# Patient Record
Sex: MALE | Race: OTHER | HISPANIC OR LATINO
[De-identification: names, ages, dates, MRNs, and addresses within clinical notes are randomized per-mention and may not be internally consistent; named-entity substitution may affect disease eponyms.]

---

## 2019-06-24 PROBLEM — Z00.00 ENCOUNTER FOR PREVENTIVE HEALTH EXAMINATION: Status: ACTIVE | Noted: 2019-06-24

## 2019-06-27 ENCOUNTER — APPOINTMENT (OUTPATIENT)
Dept: SURGERY | Facility: CLINIC | Age: 74
End: 2019-06-27
Payer: MEDICARE

## 2019-06-27 VITALS
HEART RATE: 69 BPM | SYSTOLIC BLOOD PRESSURE: 155 MMHG | TEMPERATURE: 98 F | DIASTOLIC BLOOD PRESSURE: 89 MMHG | HEIGHT: 67 IN | BODY MASS INDEX: 25.58 KG/M2 | WEIGHT: 163 LBS

## 2019-06-27 PROCEDURE — 99203 OFFICE O/P NEW LOW 30 MIN: CPT

## 2020-06-10 ENCOUNTER — EMERGENCY (EMERGENCY)
Facility: HOSPITAL | Age: 75
LOS: 1 days | Discharge: HOME | End: 2020-06-10
Attending: EMERGENCY MEDICINE
Payer: MEDICARE

## 2020-06-10 VITALS
HEART RATE: 56 BPM | OXYGEN SATURATION: 98 % | SYSTOLIC BLOOD PRESSURE: 159 MMHG | RESPIRATION RATE: 18 BRPM | TEMPERATURE: 98 F | DIASTOLIC BLOOD PRESSURE: 88 MMHG

## 2020-06-10 VITALS
WEIGHT: 147.93 LBS | RESPIRATION RATE: 18 BRPM | DIASTOLIC BLOOD PRESSURE: 90 MMHG | OXYGEN SATURATION: 99 % | SYSTOLIC BLOOD PRESSURE: 162 MMHG | HEIGHT: 67 IN | HEART RATE: 54 BPM | TEMPERATURE: 98 F

## 2020-06-10 LAB
ALBUMIN SERPL ELPH-MCNC: 3.5 G/DL — SIGNIFICANT CHANGE UP (ref 3.5–5)
ALP SERPL-CCNC: 61 U/L — SIGNIFICANT CHANGE UP (ref 40–120)
ALT FLD-CCNC: 18 U/L DA — SIGNIFICANT CHANGE UP (ref 10–60)
ANION GAP SERPL CALC-SCNC: 10 MMOL/L — SIGNIFICANT CHANGE UP (ref 5–17)
AST SERPL-CCNC: 15 U/L — SIGNIFICANT CHANGE UP (ref 10–40)
BASOPHILS # BLD AUTO: 0.09 K/UL — SIGNIFICANT CHANGE UP (ref 0–0.2)
BASOPHILS NFR BLD AUTO: 1.3 % — SIGNIFICANT CHANGE UP (ref 0–2)
BILIRUB SERPL-MCNC: 1 MG/DL — SIGNIFICANT CHANGE UP (ref 0.2–1.2)
BUN SERPL-MCNC: 13 MG/DL — SIGNIFICANT CHANGE UP (ref 7–18)
CALCIUM SERPL-MCNC: 9.1 MG/DL — SIGNIFICANT CHANGE UP (ref 8.4–10.5)
CHLORIDE SERPL-SCNC: 102 MMOL/L — SIGNIFICANT CHANGE UP (ref 96–108)
CO2 SERPL-SCNC: 27 MMOL/L — SIGNIFICANT CHANGE UP (ref 22–31)
CREAT SERPL-MCNC: 0.99 MG/DL — SIGNIFICANT CHANGE UP (ref 0.5–1.3)
EOSINOPHIL # BLD AUTO: 0.2 K/UL — SIGNIFICANT CHANGE UP (ref 0–0.5)
EOSINOPHIL NFR BLD AUTO: 2.9 % — SIGNIFICANT CHANGE UP (ref 0–6)
GLUCOSE SERPL-MCNC: 86 MG/DL — SIGNIFICANT CHANGE UP (ref 70–99)
HCT VFR BLD CALC: 39.4 % — SIGNIFICANT CHANGE UP (ref 39–50)
HGB BLD-MCNC: 13.2 G/DL — SIGNIFICANT CHANGE UP (ref 13–17)
IMM GRANULOCYTES NFR BLD AUTO: 0.1 % — SIGNIFICANT CHANGE UP (ref 0–1.5)
LYMPHOCYTES # BLD AUTO: 2.43 K/UL — SIGNIFICANT CHANGE UP (ref 1–3.3)
LYMPHOCYTES # BLD AUTO: 35.5 % — SIGNIFICANT CHANGE UP (ref 13–44)
MCHC RBC-ENTMCNC: 31.1 PG — SIGNIFICANT CHANGE UP (ref 27–34)
MCHC RBC-ENTMCNC: 33.5 GM/DL — SIGNIFICANT CHANGE UP (ref 32–36)
MCV RBC AUTO: 92.9 FL — SIGNIFICANT CHANGE UP (ref 80–100)
MONOCYTES # BLD AUTO: 0.53 K/UL — SIGNIFICANT CHANGE UP (ref 0–0.9)
MONOCYTES NFR BLD AUTO: 7.7 % — SIGNIFICANT CHANGE UP (ref 2–14)
NEUTROPHILS # BLD AUTO: 3.59 K/UL — SIGNIFICANT CHANGE UP (ref 1.8–7.4)
NEUTROPHILS NFR BLD AUTO: 52.5 % — SIGNIFICANT CHANGE UP (ref 43–77)
NRBC # BLD: 0 /100 WBCS — SIGNIFICANT CHANGE UP (ref 0–0)
PLATELET # BLD AUTO: 229 K/UL — SIGNIFICANT CHANGE UP (ref 150–400)
POTASSIUM SERPL-MCNC: 3.3 MMOL/L — LOW (ref 3.5–5.3)
POTASSIUM SERPL-SCNC: 3.3 MMOL/L — LOW (ref 3.5–5.3)
PROT SERPL-MCNC: 6.6 G/DL — SIGNIFICANT CHANGE UP (ref 6–8.3)
RBC # BLD: 4.24 M/UL — SIGNIFICANT CHANGE UP (ref 4.2–5.8)
RBC # FLD: 14 % — SIGNIFICANT CHANGE UP (ref 10.3–14.5)
SODIUM SERPL-SCNC: 139 MMOL/L — SIGNIFICANT CHANGE UP (ref 135–145)
WBC # BLD: 6.85 K/UL — SIGNIFICANT CHANGE UP (ref 3.8–10.5)
WBC # FLD AUTO: 6.85 K/UL — SIGNIFICANT CHANGE UP (ref 3.8–10.5)

## 2020-06-10 PROCEDURE — 93005 ELECTROCARDIOGRAM TRACING: CPT

## 2020-06-10 PROCEDURE — 74177 CT ABD & PELVIS W/CONTRAST: CPT

## 2020-06-10 PROCEDURE — 99285 EMERGENCY DEPT VISIT HI MDM: CPT

## 2020-06-10 PROCEDURE — 99284 EMERGENCY DEPT VISIT MOD MDM: CPT | Mod: 25

## 2020-06-10 PROCEDURE — 74177 CT ABD & PELVIS W/CONTRAST: CPT | Mod: 26

## 2020-06-10 PROCEDURE — 36415 COLL VENOUS BLD VENIPUNCTURE: CPT

## 2020-06-10 PROCEDURE — 85027 COMPLETE CBC AUTOMATED: CPT

## 2020-06-10 PROCEDURE — 80053 COMPREHEN METABOLIC PANEL: CPT

## 2020-06-10 RX ORDER — SODIUM CHLORIDE 9 MG/ML
1000 INJECTION INTRAMUSCULAR; INTRAVENOUS; SUBCUTANEOUS ONCE
Refills: 0 | Status: COMPLETED | OUTPATIENT
Start: 2020-06-10 | End: 2020-06-10

## 2020-06-10 RX ORDER — IOHEXOL 300 MG/ML
30 INJECTION, SOLUTION INTRAVENOUS ONCE
Refills: 0 | Status: COMPLETED | OUTPATIENT
Start: 2020-06-10 | End: 2020-06-10

## 2020-06-10 RX ORDER — METRONIDAZOLE 500 MG
1 TABLET ORAL
Qty: 21 | Refills: 0
Start: 2020-06-10 | End: 2020-06-16

## 2020-06-10 RX ORDER — MOXIFLOXACIN HYDROCHLORIDE TABLETS, 400 MG 400 MG/1
1 TABLET, FILM COATED ORAL
Qty: 14 | Refills: 0
Start: 2020-06-10 | End: 2020-06-16

## 2020-06-10 RX ADMIN — SODIUM CHLORIDE 1000 MILLILITER(S): 9 INJECTION INTRAMUSCULAR; INTRAVENOUS; SUBCUTANEOUS at 08:58

## 2020-06-10 RX ADMIN — IOHEXOL 30 MILLILITER(S): 300 INJECTION, SOLUTION INTRAVENOUS at 09:55

## 2020-06-10 NOTE — ED PROVIDER NOTE - NSFOLLOWUPINSTRUCTIONS_ED_ALL_ED_FT
Diverticulitis    WHAT YOU NEED TO KNOW:    Diverticulitis is a condition that causes small pockets along your intestine called diverticula to become inflamed or infected. This is caused by hard bowel movements, food, or bacteria that get stuck in the pockets.    DISCHARGE INSTRUCTIONS:    Return to the emergency department if:     You have bowel movement or foul-smelling discharge leaking from your vagina or in your urine.      You have severe diarrhea.      You urinate less than usual or not at all.      You are not able to have a bowel movement.      You cannot stop vomiting.       You have severe abdominal pain, a fever, and your abdomen is larger than usual.       You have new or increased blood in your bowel movements.     Contact your healthcare provider if:     You have pain when you urinate.      Your symptoms get worse or do not go away.       You have questions or concerns about your condition or care.     Medicines:     Antibiotics may be given to help treat a bacterial infection.      Prescription pain medicine may be given. Ask your healthcare provider how to take this medicine safely. Some prescription pain medicines contain acetaminophen. Do not take other medicines that contain acetaminophen without talking to your healthcare provider. Too much acetaminophen may cause liver damage. Prescription pain medicine may cause constipation. Ask your healthcare provider how to prevent or treat constipation.       Take your medicine as directed. Contact your healthcare provider if you think your medicine is not helping or if you have side effects. Tell him or her if you are allergic to any medicine. Keep a list of the medicines, vitamins, and herbs you take. Include the amounts, and when and why you take them. Bring the list or the pill bottles to follow-up visits. Carry your medicine list with you in case of an emergency.    Clear liquid diet: A clear liquid diet includes any liquids that you can see through. Examples include water, ginger-juana, cranberry or apple juice, frozen fruit ice, or broth. Stay on a clear liquid diet until your symptoms are gone, or as directed.     Follow up with your healthcare provider as directed: You may need to return for a colonoscopy. When your symptoms are gone, you may need a low-fat, high-fiber diet to prevent diverticulitis from developing again. Your healthcare provider or dietitian can help you create meal plans. Write down your questions so you remember to ask them during your visits.        © Copyright [employer] 2020       back to top                      © Copyright [employer] 2020       Diverticulitis Diet    WHAT YOU NEED TO KNOW:    A diverticulitis diet includes foods that allow your intestines to rest while you have diverticulitis. Diverticulitis is a condition that causes small pockets along your intestine called diverticula to become inflamed or infected. This is caused by hard bowel movement, food, or bacteria that get stuck in the pockets.    DISCHARGE INSTRUCTIONS:    Foods that may be recommended while you have diverticulitis:     A clear liquid diet may be recommended for 2 to 3 days. A clear liquid diet includes clear liquids, and foods that are liquid at room temperature. Examples include the following:   Water and clear juices (such as apple, cranberry, or grape), strained citrus juices or fruit punch      Coffee or tea (without cream or milk)      Clear sports drinks or soft drinks, such as ginger ale, lemon-lime soda, or club soda (no cola or root beer)      Clear broth, bouillon, or consommé      Plain popsicles (no popsicles with pureed fruit or fiber)      Flavored gelatin without fruit      Low-fiber foods may be recommended until your symptoms improve. Examples include the following:   Cream of wheat and finely ground grits      White bread, white pasta, and white rice      Canned and well-cooked fruit without skins or seeds, and juice without pulp      Canned and well-cooked vegetables without skins or seeds, and vegetable juice      Cow's milk, lactose-free milk, soy milk, and rice milk      Yogurt, cottage cheese, and sherbet      Eggs, poultry (such as chicken and turkey), fish, and tender, ground, well-cooked beef       Tofu and smooth nut butters, such as peanut butter      Broth and strained soups made of low-fiber foods     High-fiber foods can help prevent diverticulosis and diverticulitis. Your healthcare provider will tell you when you can add high-fiber foods back into your diet. Examples include the following:     Whole grains and breads, and cereals made with whole grains      Dried fruit, fresh fruit with skin, and fruit pulp      Raw vegetables      Cooked greens, such as spinach      Tough meat and meat with gristle      Legumes, such as vivas beans and lentils     Contact your healthcare provider if:     Your symptoms do not get better, or they get worse.       You have questions about the foods you should eat.      You have questions or concerns about your condition or care.         © Copyright [employer] 2020       back to top                      © Copyright [employer] 2020

## 2020-06-10 NOTE — ED PROVIDER NOTE - PATIENT PORTAL LINK FT
You can access the FollowMyHealth Patient Portal offered by [facility] by registering at the following website: [email or web address]. By joining [employer]’s FollowMyHealth portal, you will also be able to view your health information using other applications (apps) compatible with our system.

## 2020-06-10 NOTE — CONSULT NOTE ADULT - SUBJECTIVE AND OBJECTIVE BOX
Attending:  Dr Lomax     HPI:  75 y/o Male w/ PMHx of HTN, Afib, no PSHx presented to ED w/ complaints of chronic right sided abd pain, pain present for the past 2 months, recently more severe, non radiating, constant, dull; pt denies any nausea or vomiting, no fever, chills, no changes in bowel habits; no precipitating factors; last colonoscopy done 2 yrs ago and showed diverticulosis; Pt admits to recent weight loss, 10 Lbs in the last 2 months, lack of appetite; pt had outpatient CT abd/pelvis done in May 2020 and wnl; seen by Dr Lomax in office and recommended further evaluation in ED.     PAST MEDICAL & SURGICAL HISTORY:  Hypertension  Afib    Allergies    No Known Allergies    Intolerances        SOCIAL HISTORY:  Unknown   FAMILY HISTORY:  Unknown     Vital Signs Last 24 Hrs  T(C): 36.8 (10 Abner 2020 08:11), Max: 36.8 (10 Abner 2020 08:11)  T(F): 98.2 (10 Abner 2020 08:11), Max: 98.2 (10 Abner 2020 08:11)  HR: 54 (10 Abner 2020 08:11) (54 - 54)  BP: 162/90 (10 Abner 2020 08:11) (162/90 - 162/90)  BP(mean): --  RR: 18 (10 Abner 2020 08:11) (18 - 18)  SpO2: 99% (10 Abner 2020 08:11) (99% - 99%)    I&O's Summary      LABS:                        13.2   6.85  )-----------( 229      ( 10 Abner 2020 09:40 )             39.4     06-10    139  |  102  |  13  ----------------------------<  86  3.3<L>   |  27  |  0.99    Ca    9.1      10 Abner 2020 09:41    TPro  6.6  /  Alb  3.5  /  TBili  1.0  /  DBili  x   /  AST  15  /  ALT  18  /  AlkPhos  61  06-10        CAPILLARY BLOOD GLUCOSE        LIVER FUNCTIONS - ( 10 Abner 2020 09:41 )  Alb: 3.5 g/dL / Pro: 6.6 g/dL / ALK PHOS: 61 U/L / ALT: 18 U/L DA / AST: 15 U/L / GGT: x               RADIOLOGY & ADDITIONAL STUDIES:  < from: CT Abdomen and Pelvis w/ Oral Cont and w/ IV Cont (06.10.20 @ 13:02) >  FINDINGS:  LOWER CHEST: Mild bibasilar dependentchanges. Mild cardiomegaly. Trace pericardial effusion.    LIVER: Hepatic steatosis.  BILE DUCTS: Normal caliber.  GALLBLADDER: Within normal limits.  SPLEEN: Scattered calcified splenic granulomata.  PANCREAS: Within normal limits.  ADRENALS: 8 mm low-density right adrenal nodule. Mild left adrenal thickening.  KIDNEYS/URETERS: Subcentimeter left upper pole renal hypodensity too small to characterize. No hydroureteronephrosis or calculi. Symmetrical nephrograms. No perinephric edema.    BLADDER:Within normal limits.  REPRODUCTIVE ORGANS: Mildly enlarged prostate measuring 4.7 x 4.0 x 3.9 cm. 5 mm left-sided coarse calcification.    BOWEL: No bowel obstruction. Moderate diffuse diverticulosis, most pronounced in the sigmoid colon, without diverticulitis. Appendix is normal.  PERITONEUM: No ascites. No pneumoperitoneum.  VESSELS: Atherosclerotic changes.  RETROPERITONEUM/LYMPH NODES: No lymphadenopathy.    ABDOMINAL WALL: Within normal limits.  BONES: Thoracolumbar degenerative changes. Mild dextroscoliosis. Grade 1 L4-5 anterolisthesis.    IMPRESSION:     Moderate diffuse colonic diverticulosis, most pronounced in the sigmoid colon, without diverticulitis.    Mild cardiomegaly. Trace pericardial effusion.    Mildly enlarged prostate.    Hepatic steatosis.    Splenic granulomata.    8 mm low-density right adrenal nodule. Mild left adrenal thickening.    < end of copied text >

## 2020-06-10 NOTE — CONSULT NOTE ADULT - ASSESSMENT
75 y/o Male w/ chronic abd pain    -CT abd/pelvis images reviewed, no acute surgical pathology; abd exam benign; No acute surgical intervention at this time   -Outpatient follow up w/ Dr Lomax   -D/w Dr Lomax and agrees

## 2020-06-10 NOTE — ED ADULT NURSE NOTE - CHPI ED NUR SYMPTOMS NEG
no burning urination/no diarrhea/no abdominal distension/no blood in stool/no chills/no dysuria/no fever/no nausea/no hematuria/no vomiting

## 2020-06-10 NOTE — ED PROVIDER NOTE - OBJECTIVE STATEMENT
Patient reports constant RLQ pain for 2 months with loss of appetite and 10Lbs wt loss. No fever, cp, sob, n/v/d. Had outpatient CT scan that was normal. Spoke with Dr. Lomax this morning who told him to go to ED for further evaluation.

## 2020-06-10 NOTE — ED ADULT NURSE NOTE - NSIMPLEMENTINTERV_GEN_ALL_ED
Implemented All Universal Safety Interventions:  [place] to call system. Call bell, personal items and telephone within reach. Instruct patient to call for assistance. Room bathroom lighting operational. Non-slip footwear when patient is off stretcher. Physically safe environment: no spills, clutter or unnecessary equipment. Stretcher in lowest position, wheels locked, appropriate side rails in place.

## 2020-06-10 NOTE — ED PROVIDER NOTE - PROGRESS NOTE DETAILS
Spoke with surgery PA. requested repeat CT. I reviewed CT report from outpatient CT on 5/11/20 - no findings. Patient is resting comfortably, NAD. Patient remains comfortable. Spoke with surgical PA. Dr. Lomax will come down to see patient. Pt seen by Dr. Lomax. Pt told me his sx did improve after being treated with cipro/flagyl. Will try another course for possible very mild diverticulitis.